# Patient Record
Sex: FEMALE | Employment: FULL TIME
[De-identification: names, ages, dates, MRNs, and addresses within clinical notes are randomized per-mention and may not be internally consistent; named-entity substitution may affect disease eponyms.]

---
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
## 2021-06-25 ENCOUNTER — LAB ENCOUNTER (OUTPATIENT)
Dept: LAB | Age: 21
End: 2021-06-25
Attending: PEDIATRICS
Payer: COMMERCIAL

## 2021-06-25 DIAGNOSIS — Z00.00 GENERAL MEDICAL EXAMINATION: Primary | ICD-10-CM

## 2021-06-25 PROCEDURE — 85652 RBC SED RATE AUTOMATED: CPT

## 2021-06-25 PROCEDURE — 83516 IMMUNOASSAY NONANTIBODY: CPT

## 2021-06-25 PROCEDURE — 80053 COMPREHEN METABOLIC PANEL: CPT

## 2021-06-25 PROCEDURE — 84443 ASSAY THYROID STIM HORMONE: CPT

## 2021-06-25 PROCEDURE — 36415 COLL VENOUS BLD VENIPUNCTURE: CPT

## 2021-06-25 PROCEDURE — 82306 VITAMIN D 25 HYDROXY: CPT

## 2021-06-25 PROCEDURE — 80061 LIPID PANEL: CPT

## 2021-06-25 PROCEDURE — 83550 IRON BINDING TEST: CPT

## 2021-06-25 PROCEDURE — 83540 ASSAY OF IRON: CPT

## 2021-06-25 PROCEDURE — 85025 COMPLETE CBC W/AUTO DIFF WBC: CPT

## 2021-06-25 PROCEDURE — 84439 ASSAY OF FREE THYROXINE: CPT

## 2021-06-25 PROCEDURE — 82728 ASSAY OF FERRITIN: CPT

## 2024-02-27 ENCOUNTER — APPOINTMENT (OUTPATIENT)
Dept: CT IMAGING | Age: 24
End: 2024-02-27
Attending: EMERGENCY MEDICINE
Payer: COMMERCIAL

## 2024-02-27 ENCOUNTER — ANESTHESIA (OUTPATIENT)
Dept: SURGERY | Facility: HOSPITAL | Age: 24
End: 2024-02-27
Payer: COMMERCIAL

## 2024-02-27 ENCOUNTER — HOSPITAL ENCOUNTER (OUTPATIENT)
Facility: HOSPITAL | Age: 24
Setting detail: OBSERVATION
Discharge: HOME | End: 2024-02-27
Attending: EMERGENCY MEDICINE | Admitting: STUDENT IN AN ORGANIZED HEALTH CARE EDUCATION/TRAINING PROGRAM
Payer: COMMERCIAL

## 2024-02-27 ENCOUNTER — ANESTHESIA EVENT (OUTPATIENT)
Dept: SURGERY | Facility: HOSPITAL | Age: 24
End: 2024-02-27
Payer: COMMERCIAL

## 2024-02-27 VITALS
SYSTOLIC BLOOD PRESSURE: 104 MMHG | HEIGHT: 64 IN | DIASTOLIC BLOOD PRESSURE: 65 MMHG | BODY MASS INDEX: 23.9 KG/M2 | WEIGHT: 140 LBS | TEMPERATURE: 98 F | HEART RATE: 77 BPM | OXYGEN SATURATION: 100 % | RESPIRATION RATE: 18 BRPM

## 2024-02-27 DIAGNOSIS — G89.18 POSTOPERATIVE PAIN: ICD-10-CM

## 2024-02-27 DIAGNOSIS — K37 APPENDICITIS: ICD-10-CM

## 2024-02-27 DIAGNOSIS — K35.80 ACUTE APPENDICITIS, UNSPECIFIED ACUTE APPENDICITIS TYPE: Primary | ICD-10-CM

## 2024-02-27 LAB
ALBUMIN SERPL-MCNC: 4.2 G/DL (ref 3.4–5)
ALBUMIN/GLOB SERPL: 1.1 {RATIO} (ref 1–2)
ALP LIVER SERPL-CCNC: 78 U/L
ALT SERPL-CCNC: 14 U/L
ANION GAP SERPL CALC-SCNC: 7 MMOL/L (ref 0–18)
AST SERPL-CCNC: 13 U/L (ref 15–37)
B-HCG UR QL: NEGATIVE
BASOPHILS # BLD AUTO: 0.03 X10(3) UL (ref 0–0.2)
BASOPHILS NFR BLD AUTO: 0.2 %
BILIRUB SERPL-MCNC: 0.4 MG/DL (ref 0.1–2)
BILIRUB UR QL STRIP.AUTO: NEGATIVE
BUN BLD-MCNC: 11 MG/DL (ref 9–23)
CALCIUM BLD-MCNC: 9.8 MG/DL (ref 8.5–10.1)
CHLORIDE SERPL-SCNC: 104 MMOL/L (ref 98–112)
CLARITY UR REFRACT.AUTO: CLEAR
CO2 SERPL-SCNC: 25 MMOL/L (ref 21–32)
COLOR UR AUTO: YELLOW
CREAT BLD-MCNC: 0.88 MG/DL
EGFRCR SERPLBLD CKD-EPI 2021: 95 ML/MIN/1.73M2 (ref 60–?)
EOSINOPHIL # BLD AUTO: 0.02 X10(3) UL (ref 0–0.7)
EOSINOPHIL NFR BLD AUTO: 0.1 %
ERYTHROCYTE [DISTWIDTH] IN BLOOD BY AUTOMATED COUNT: 16.1 %
GLOBULIN PLAS-MCNC: 3.8 G/DL (ref 2.8–4.4)
GLUCOSE BLD-MCNC: 128 MG/DL (ref 70–99)
GLUCOSE UR STRIP.AUTO-MCNC: NEGATIVE MG/DL
HCG SERPL QL: NEGATIVE
HCT VFR BLD AUTO: 39.3 %
HGB BLD-MCNC: 12.7 G/DL
IMM GRANULOCYTES # BLD AUTO: 0.07 X10(3) UL (ref 0–1)
IMM GRANULOCYTES NFR BLD: 0.4 %
KETONES UR STRIP.AUTO-MCNC: 40 MG/DL
LACTATE SERPL-SCNC: 1.1 MMOL/L (ref 0.4–2)
LEUKOCYTE ESTERASE UR QL STRIP.AUTO: NEGATIVE
LIPASE SERPL-CCNC: 30 U/L (ref 13–75)
LYMPHOCYTES # BLD AUTO: 0.56 X10(3) UL (ref 1–4)
LYMPHOCYTES NFR BLD AUTO: 3.1 %
MCH RBC QN AUTO: 24 PG (ref 26–34)
MCHC RBC AUTO-ENTMCNC: 32.3 G/DL (ref 31–37)
MCV RBC AUTO: 74.3 FL
MONOCYTES # BLD AUTO: 1.3 X10(3) UL (ref 0.1–1)
MONOCYTES NFR BLD AUTO: 7.1 %
NEUTROPHILS # BLD AUTO: 16.32 X10 (3) UL (ref 1.5–7.7)
NEUTROPHILS # BLD AUTO: 16.32 X10(3) UL (ref 1.5–7.7)
NEUTROPHILS NFR BLD AUTO: 89.1 %
NITRITE UR QL STRIP.AUTO: NEGATIVE
OSMOLALITY SERPL CALC.SUM OF ELEC: 283 MOSM/KG (ref 275–295)
PH UR STRIP.AUTO: 8.5 [PH] (ref 5–8)
PLATELET # BLD AUTO: 335 10(3)UL (ref 150–450)
POTASSIUM SERPL-SCNC: 3.7 MMOL/L (ref 3.5–5.1)
PROT SERPL-MCNC: 8 G/DL (ref 6.4–8.2)
PROT UR STRIP.AUTO-MCNC: NEGATIVE MG/DL
RBC # BLD AUTO: 5.29 X10(6)UL
RBC UR QL AUTO: NEGATIVE
SODIUM SERPL-SCNC: 136 MMOL/L (ref 136–145)
SP GR UR STRIP.AUTO: 1.02 (ref 1–1.03)
UROBILINOGEN UR STRIP.AUTO-MCNC: 0.2 MG/DL
WBC # BLD AUTO: 18.3 X10(3) UL (ref 4–11)

## 2024-02-27 PROCEDURE — 99245 OFF/OP CONSLTJ NEW/EST HI 55: CPT | Performed by: STUDENT IN AN ORGANIZED HEALTH CARE EDUCATION/TRAINING PROGRAM

## 2024-02-27 PROCEDURE — 74177 CT ABD & PELVIS W/CONTRAST: CPT | Performed by: EMERGENCY MEDICINE

## 2024-02-27 PROCEDURE — 44970 LAPAROSCOPY APPENDECTOMY: CPT | Performed by: STUDENT IN AN ORGANIZED HEALTH CARE EDUCATION/TRAINING PROGRAM

## 2024-02-27 PROCEDURE — 0DTJ4ZZ RESECTION OF APPENDIX, PERCUTANEOUS ENDOSCOPIC APPROACH: ICD-10-PCS | Performed by: STUDENT IN AN ORGANIZED HEALTH CARE EDUCATION/TRAINING PROGRAM

## 2024-02-27 PROCEDURE — 44970 LAPAROSCOPY APPENDECTOMY: CPT

## 2024-02-27 RX ORDER — HYDROMORPHONE HYDROCHLORIDE 1 MG/ML
0.1 INJECTION, SOLUTION INTRAMUSCULAR; INTRAVENOUS; SUBCUTANEOUS EVERY 2 HOUR PRN
Status: DISCONTINUED | OUTPATIENT
Start: 2024-02-27 | End: 2024-02-27

## 2024-02-27 RX ORDER — ONDANSETRON 2 MG/ML
4 INJECTION INTRAMUSCULAR; INTRAVENOUS ONCE
Status: COMPLETED | OUTPATIENT
Start: 2024-02-27 | End: 2024-02-27

## 2024-02-27 RX ORDER — HYDROCODONE BITARTRATE AND ACETAMINOPHEN 5; 325 MG/1; MG/1
1 TABLET ORAL ONCE AS NEEDED
Status: DISCONTINUED | OUTPATIENT
Start: 2024-02-27 | End: 2024-02-27

## 2024-02-27 RX ORDER — MORPHINE SULFATE 4 MG/ML
4 INJECTION, SOLUTION INTRAMUSCULAR; INTRAVENOUS ONCE
Status: COMPLETED | OUTPATIENT
Start: 2024-02-27 | End: 2024-02-27

## 2024-02-27 RX ORDER — LIDOCAINE HYDROCHLORIDE 10 MG/ML
INJECTION, SOLUTION EPIDURAL; INFILTRATION; INTRACAUDAL; PERINEURAL AS NEEDED
Status: DISCONTINUED | OUTPATIENT
Start: 2024-02-27 | End: 2024-02-27 | Stop reason: SURG

## 2024-02-27 RX ORDER — SODIUM CHLORIDE, SODIUM LACTATE, POTASSIUM CHLORIDE, CALCIUM CHLORIDE 600; 310; 30; 20 MG/100ML; MG/100ML; MG/100ML; MG/100ML
INJECTION, SOLUTION INTRAVENOUS CONTINUOUS PRN
Status: DISCONTINUED | OUTPATIENT
Start: 2024-02-27 | End: 2024-02-27 | Stop reason: SURG

## 2024-02-27 RX ORDER — NALOXONE HYDROCHLORIDE 0.4 MG/ML
0.08 INJECTION, SOLUTION INTRAMUSCULAR; INTRAVENOUS; SUBCUTANEOUS AS NEEDED
Status: DISCONTINUED | OUTPATIENT
Start: 2024-02-27 | End: 2024-02-27

## 2024-02-27 RX ORDER — KETOROLAC TROMETHAMINE 15 MG/ML
15 INJECTION, SOLUTION INTRAMUSCULAR; INTRAVENOUS EVERY 6 HOURS PRN
Status: DISCONTINUED | OUTPATIENT
Start: 2024-02-27 | End: 2024-02-27

## 2024-02-27 RX ORDER — BUPIVACAINE HYDROCHLORIDE 2.5 MG/ML
INJECTION, SOLUTION EPIDURAL; INFILTRATION; INTRACAUDAL AS NEEDED
Status: DISCONTINUED | OUTPATIENT
Start: 2024-02-27 | End: 2024-02-27 | Stop reason: HOSPADM

## 2024-02-27 RX ORDER — DEXAMETHASONE SODIUM PHOSPHATE 4 MG/ML
VIAL (ML) INJECTION AS NEEDED
Status: DISCONTINUED | OUTPATIENT
Start: 2024-02-27 | End: 2024-02-27 | Stop reason: SURG

## 2024-02-27 RX ORDER — ACETAMINOPHEN 500 MG
1000 TABLET ORAL ONCE AS NEEDED
Status: DISCONTINUED | OUTPATIENT
Start: 2024-02-27 | End: 2024-02-27

## 2024-02-27 RX ORDER — NEOSTIGMINE METHYLSULFATE 1 MG/ML
INJECTION, SOLUTION INTRAVENOUS AS NEEDED
Status: DISCONTINUED | OUTPATIENT
Start: 2024-02-27 | End: 2024-02-27 | Stop reason: SURG

## 2024-02-27 RX ORDER — ROCURONIUM BROMIDE 10 MG/ML
INJECTION, SOLUTION INTRAVENOUS AS NEEDED
Status: DISCONTINUED | OUTPATIENT
Start: 2024-02-27 | End: 2024-02-27 | Stop reason: SURG

## 2024-02-27 RX ORDER — ONDANSETRON 2 MG/ML
INJECTION INTRAMUSCULAR; INTRAVENOUS AS NEEDED
Status: DISCONTINUED | OUTPATIENT
Start: 2024-02-27 | End: 2024-02-27 | Stop reason: SURG

## 2024-02-27 RX ORDER — HYDROMORPHONE HYDROCHLORIDE 1 MG/ML
0.2 INJECTION, SOLUTION INTRAMUSCULAR; INTRAVENOUS; SUBCUTANEOUS EVERY 5 MIN PRN
Status: DISCONTINUED | OUTPATIENT
Start: 2024-02-27 | End: 2024-02-27

## 2024-02-27 RX ORDER — ONDANSETRON 4 MG/1
4 TABLET, ORALLY DISINTEGRATING ORAL EVERY 6 HOURS PRN
Status: DISCONTINUED | OUTPATIENT
Start: 2024-02-27 | End: 2024-02-27

## 2024-02-27 RX ORDER — HYDROMORPHONE HYDROCHLORIDE 1 MG/ML
0.6 INJECTION, SOLUTION INTRAMUSCULAR; INTRAVENOUS; SUBCUTANEOUS EVERY 5 MIN PRN
Status: DISCONTINUED | OUTPATIENT
Start: 2024-02-27 | End: 2024-02-27

## 2024-02-27 RX ORDER — HYDROMORPHONE HYDROCHLORIDE 1 MG/ML
0.4 INJECTION, SOLUTION INTRAMUSCULAR; INTRAVENOUS; SUBCUTANEOUS EVERY 5 MIN PRN
Status: DISCONTINUED | OUTPATIENT
Start: 2024-02-27 | End: 2024-02-27

## 2024-02-27 RX ORDER — ONDANSETRON 2 MG/ML
4 INJECTION INTRAMUSCULAR; INTRAVENOUS EVERY 6 HOURS PRN
Status: DISCONTINUED | OUTPATIENT
Start: 2024-02-27 | End: 2024-02-27

## 2024-02-27 RX ORDER — GLYCOPYRROLATE 0.2 MG/ML
INJECTION, SOLUTION INTRAMUSCULAR; INTRAVENOUS AS NEEDED
Status: DISCONTINUED | OUTPATIENT
Start: 2024-02-27 | End: 2024-02-27 | Stop reason: SURG

## 2024-02-27 RX ORDER — HYDROCODONE BITARTRATE AND ACETAMINOPHEN 5; 325 MG/1; MG/1
1 TABLET ORAL EVERY 6 HOURS PRN
Qty: 15 TABLET | Refills: 0 | Status: SHIPPED | OUTPATIENT
Start: 2024-02-27

## 2024-02-27 RX ORDER — SODIUM CHLORIDE 9 MG/ML
INJECTION, SOLUTION INTRAVENOUS CONTINUOUS
Status: DISCONTINUED | OUTPATIENT
Start: 2024-02-27 | End: 2024-02-27

## 2024-02-27 RX ORDER — KETOROLAC TROMETHAMINE 30 MG/ML
INJECTION, SOLUTION INTRAMUSCULAR; INTRAVENOUS
Status: COMPLETED
Start: 2024-02-27 | End: 2024-02-27

## 2024-02-27 RX ORDER — MIDAZOLAM HYDROCHLORIDE 1 MG/ML
INJECTION INTRAMUSCULAR; INTRAVENOUS AS NEEDED
Status: DISCONTINUED | OUTPATIENT
Start: 2024-02-27 | End: 2024-02-27 | Stop reason: SURG

## 2024-02-27 RX ORDER — HEPARIN SODIUM 5000 [USP'U]/ML
5000 INJECTION, SOLUTION INTRAVENOUS; SUBCUTANEOUS ONCE
Status: COMPLETED | OUTPATIENT
Start: 2024-02-27 | End: 2024-02-27

## 2024-02-27 RX ORDER — HYDROMORPHONE HYDROCHLORIDE 1 MG/ML
0.4 INJECTION, SOLUTION INTRAMUSCULAR; INTRAVENOUS; SUBCUTANEOUS EVERY 2 HOUR PRN
Status: DISCONTINUED | OUTPATIENT
Start: 2024-02-27 | End: 2024-02-27

## 2024-02-27 RX ORDER — MIDAZOLAM HYDROCHLORIDE 1 MG/ML
1 INJECTION INTRAMUSCULAR; INTRAVENOUS EVERY 5 MIN PRN
Status: DISCONTINUED | OUTPATIENT
Start: 2024-02-27 | End: 2024-02-27

## 2024-02-27 RX ORDER — METRONIDAZOLE 500 MG/100ML
500 INJECTION, SOLUTION INTRAVENOUS ONCE
Status: DISCONTINUED | OUTPATIENT
Start: 2024-02-27 | End: 2024-02-27

## 2024-02-27 RX ORDER — CEFAZOLIN SODIUM/WATER 2 G/20 ML
SYRINGE (ML) INTRAVENOUS AS NEEDED
Status: DISCONTINUED | OUTPATIENT
Start: 2024-02-27 | End: 2024-02-27 | Stop reason: SURG

## 2024-02-27 RX ORDER — HYDROCODONE BITARTRATE AND ACETAMINOPHEN 5; 325 MG/1; MG/1
2 TABLET ORAL ONCE AS NEEDED
Status: DISCONTINUED | OUTPATIENT
Start: 2024-02-27 | End: 2024-02-27

## 2024-02-27 RX ORDER — SODIUM CHLORIDE, SODIUM LACTATE, POTASSIUM CHLORIDE, CALCIUM CHLORIDE 600; 310; 30; 20 MG/100ML; MG/100ML; MG/100ML; MG/100ML
INJECTION, SOLUTION INTRAVENOUS CONTINUOUS
Status: DISCONTINUED | OUTPATIENT
Start: 2024-02-27 | End: 2024-02-27

## 2024-02-27 RX ORDER — PROCHLORPERAZINE EDISYLATE 5 MG/ML
5 INJECTION INTRAMUSCULAR; INTRAVENOUS EVERY 8 HOURS PRN
Status: DISCONTINUED | OUTPATIENT
Start: 2024-02-27 | End: 2024-02-27

## 2024-02-27 RX ORDER — KETOROLAC TROMETHAMINE 30 MG/ML
30 INJECTION, SOLUTION INTRAMUSCULAR; INTRAVENOUS ONCE
Status: COMPLETED | OUTPATIENT
Start: 2024-02-27 | End: 2024-02-27

## 2024-02-27 RX ORDER — ACETAMINOPHEN 325 MG/1
650 TABLET ORAL EVERY 6 HOURS PRN
Status: DISCONTINUED | OUTPATIENT
Start: 2024-02-27 | End: 2024-02-27

## 2024-02-27 RX ORDER — HYDROMORPHONE HYDROCHLORIDE 1 MG/ML
0.2 INJECTION, SOLUTION INTRAMUSCULAR; INTRAVENOUS; SUBCUTANEOUS EVERY 2 HOUR PRN
Status: DISCONTINUED | OUTPATIENT
Start: 2024-02-27 | End: 2024-02-27

## 2024-02-27 RX ORDER — HYDROCODONE BITARTRATE AND ACETAMINOPHEN 5; 325 MG/1; MG/1
1 TABLET ORAL EVERY 6 HOURS PRN
Qty: 30 TABLET | Refills: 0 | Status: SHIPPED | OUTPATIENT
Start: 2024-02-27

## 2024-02-27 RX ADMIN — SODIUM CHLORIDE, SODIUM LACTATE, POTASSIUM CHLORIDE, CALCIUM CHLORIDE: 600; 310; 30; 20 INJECTION, SOLUTION INTRAVENOUS at 14:38:00

## 2024-02-27 RX ADMIN — DEXAMETHASONE SODIUM PHOSPHATE 4 MG: 4 MG/ML VIAL (ML) INJECTION at 14:45:00

## 2024-02-27 RX ADMIN — CEFAZOLIN SODIUM/WATER 2 G: 2 G/20 ML SYRINGE (ML) INTRAVENOUS at 15:06:00

## 2024-02-27 RX ADMIN — ONDANSETRON 4 MG: 2 INJECTION INTRAMUSCULAR; INTRAVENOUS at 15:24:00

## 2024-02-27 RX ADMIN — NEOSTIGMINE METHYLSULFATE 1 MG: 1 INJECTION, SOLUTION INTRAVENOUS at 15:30:00

## 2024-02-27 RX ADMIN — NEOSTIGMINE METHYLSULFATE 2 MG: 1 INJECTION, SOLUTION INTRAVENOUS at 15:27:00

## 2024-02-27 RX ADMIN — LIDOCAINE HYDROCHLORIDE 100 MG: 10 INJECTION, SOLUTION EPIDURAL; INFILTRATION; INTRACAUDAL; PERINEURAL at 14:41:00

## 2024-02-27 RX ADMIN — MIDAZOLAM HYDROCHLORIDE 2 MG: 1 INJECTION INTRAMUSCULAR; INTRAVENOUS at 14:39:00

## 2024-02-27 RX ADMIN — ROCURONIUM BROMIDE 30 MG: 10 INJECTION, SOLUTION INTRAVENOUS at 14:45:00

## 2024-02-27 RX ADMIN — GLYCOPYRROLATE 0.4 MG: 0.2 INJECTION, SOLUTION INTRAMUSCULAR; INTRAVENOUS at 15:27:00

## 2024-02-27 NOTE — H&P
[facility]  Report of  Surgical Consultation with History and Physical Exam    Nida Mulligan Patient Status:  Observation    10/12/2000 MRN [id number]   Location [facility] [place] 2SW-S Attending Grant Bragg MD   Hosp Day # 0 PCP None Pcp     Reason for Surgical Consultation:  I am seeing this patient at the request of Dr. Coombs for abdominal pain.    History of Present Illness:  Nida Mulligan is a a(n) 23 year old female who presented to the [facility] ER early this morning with several hours of worsening abdominal pain.    The patient states around 11 PM yesterday evening, she had some leftover pasta.  She states approximately 1 hour later, she began to have some abdominal discomfort.  She felt as though she needed to vomit, but was unable to.  Then approximately 2 hours later, she began to have sharp periumbilical pain.  She states this pain was constant.  It was associated with 2 episodes of diarrhea and 6-7 episodes of emesis.  She states she took Tylenol while at home, with minimal relief.    She had a Tmax of 100 at the [facility] emergency department.  She has been tachycardic with a heart rate in the low 100s.  She has had associated chills.    Upon presentation to the emergency department, CT scan of the abdomen and pelvis revealed a mildly enlarged appendix measuring up to 1 cm.  There is mild infiltration of fat around the appendix.  Findings are consistent with early appendicitis.  No evidence of perforation or abscess.  All other significant findings can be seen in the radiologist report.    WBCs elevated at 18.3.  Hemoglobin 12.7.  Platelets 335.  Slightly hyperglycemic at 128.  AST slightly low at 13.  CMP is otherwise within normal limits.  Lipase is within normal limits at 30.  Lactic acid 1.1.    The patient has no significant past medical history.  She does not take any blood thinning medications.    The patient has no significant past surgical  history.            History:  History reviewed. No pertinent past medical history.  History reviewed. No pertinent surgical history.  No family history on file.   reports that she has never smoked. She has never been exposed to tobacco smoke. She has never used smokeless tobacco. She reports that she does not currently use alcohol. She reports that she does not currently use drugs.    Allergies:  No Known Allergies    Medications:    Current Facility-Administered Medications:     metRONIDAZOLE in sodium chloride 0.79% (Flagyl) 5 mg/mL IVPB premix 500 mg, 500 mg, Intravenous, Once    HYDROmorphone (Dilaudid) 1 MG/ML injection 0.1 mg, 0.1 mg, Intravenous, Q2H PRN **OR** HYDROmorphone (Dilaudid) 1 MG/ML injection 0.2 mg, 0.2 mg, Intravenous, Q2H PRN **OR** HYDROmorphone (Dilaudid) 1 MG/ML injection 0.4 mg, 0.4 mg, Intravenous, Q2H PRN    acetaminophen (Tylenol) tab 650 mg, 650 mg, Oral, Q6H PRN    ketorolac (Toradol) 15 MG/ML injection 15 mg, 15 mg, Intravenous, Q6H PRN    ondansetron (Zofran-ODT) disintegrating tab 4 mg, 4 mg, Oral, Q6H PRN **OR** ondansetron (Zofran) 4 MG/2ML injection 4 mg, 4 mg, Intravenous, Q6H PRN    sodium chloride 0.9% infusion, , Intravenous, Continuous    Review of Systems:  Pertinent items are noted in HPI.  Allergic/Immuno:  Review of patient's allergies performed.  Cardiovascular:  Negative for cool extremity and irregular heartbeat/palpitations  Constitutional:  Negative for decreased activity, fever, irritability and lethargy  Endocrine:  Negative for abnormal sleep patterns, increased activity, polydipsia and polyphagia  ENMT:  Negative for ear drainage, hearing loss and nasal drainage  Eyes:  Negative for eye discharge and vision loss  Gastrointestinal: Positive for abdominal pain, nausea, vomiting, diarrhea   Genitourinary:  Negative for dysuria and hematuria  Hema/Lymph:  Negative for easy bleeding and easy bruising  Integumentary:  Negative for pruritus and rash  Musculoskeletal:   Negative for bone/joint symptoms  Neurological:  Negative for gait disturbance  Psychiatric:  Negative for inappropriate interaction and psychiatric symptoms  Respiratory:  Negative for cough, dyspnea and wheezing      Physical Exam:  Blood pressure 106/65, pulse 106, temperature 100 °F (37.8 °C), temperature source Temporal, resp. rate 18, height 64\", weight 140 lb (63.5 kg), last menstrual period 02/11/2024, SpO2 100%.    General: Alert, orientated x3.  Cooperative.  No apparent distress.    HEENT: Exam is unremarkable.  Without scleral icterus.  Mucous membranes are moist. EOM are WNL.    Neck: No tenderness to palpitation.  Full range of motion to flexion and extension, lateral rotation and lateral flexion of cervical spine.  No JVD. Supple.     Lungs:  No secondary use of accessory respiratory musculature.    Abdomen: Soft, nondistended without tympany to percussion.  Minimal tenderness to palpation in the right lower quadrant and periumbilical regions.  No rebound or guarding.    Extremities:  No lower extremity edema noted.  Without clubbing or cyanosis.      Skin: Normal texture and turgor.      Laboratory Data and Relevant X-rays:  Recent Labs   Lab 02/27/24  0459   RBC 5.29   HGB 12.7   HCT 39.3   MCV 74.3*   MCH 24.0*   MCHC 32.3   RDW 16.1   NEPRELIM 16.32*   WBC 18.3*   .0       Recent Labs   Lab 02/27/24  0459   *   BUN 11   CREATSERUM 0.88   CA 9.8   ALB 4.2      K 3.7      CO2 25.0   ALKPHO 78   AST 13*   ALT 14   BILT 0.4   TP 8.0         No results for input(s): \"PTP\", \"INR\", \"PTT\" in the last 168 hours.    Imaging    Narrative   PROCEDURE:  CT ABDOMEN+PELVIS (CONTRAST ONLY) (CPT=74177)     COMPARISON:  None.     INDICATIONS:  Mid abdominal pain for the past 4 hours, N/V/D     TECHNIQUE:  CT scanning was performed from the dome of the diaphragm to the pubic symphysis with non-ionic intravenous contrast material. Post contrast coronal MPR imaging was performed.  Dose  reduction techniques were used. Dose information is  transmitted to the ACR (American College of Radiology) NRDR (National Radiology Data Registry) which includes the Dose Index Registry.     PATIENT STATED HISTORY:(As transcribed by Technologist)  Pt complains of midline abd pain with N/V/D      CONTRAST USED:  80cc of Isovue 370     FINDINGS:    LIVER:  No enlargement, atrophy, abnormal density, or significant focal lesion.    BILIARY:  No visible dilatation or calcification.    PANCREAS:  No lesion, fluid collection, ductal dilatation, or atrophy.    SPLEEN:  No enlargement or focal lesion.    KIDNEYS:  No mass, obstruction, or calcification.    ADRENALS:  No mass or enlargement.    AORTA/VASCULAR:  No aneurysm or dissection.    RETROPERITONEUM:  No mass or adenopathy.    BOWEL/MESENTERY:  Appendix is mildly enlarged measuring up to 1 cm.  There is mild infiltration of the fat around the appendix.  Findings could represent early appendicitis in the proper clinical setting.  ABDOMINAL WALL:  No mass or hernia.    URINARY BLADDER:  No visible focal wall thickening, lesion, or calculus.    PELVIC NODES:  No adenopathy.    PELVIC ORGANS:  No visible mass.  Pelvic organs appropriate for patient age.    BONES:  No bony lesion or fracture.    LUNG BASES:  No visible pulmonary or pleural disease.    OTHER:  Negative.                     Impression   CONCLUSION:  Appendix is mildly enlarged.  There is mild stranding around the appendix.  Findings likely represent early acute appendicitis in the proper clinical setting.        LOCATION:  [facility]        Dictated by (CST): Aakash Bridges MD on 2/27/2024 at 6:30 AM      Finalized by (CST): Aakash Bridges MD on 2/27/2024 at 6:37 AM       Kasey Saul PA-C  2/27/2024  11:09 AM    Is this a shared or split note between Advanced Practice Provider and Physician? Yes   Impression:  Patient Active Problem List   Diagnosis    Acute appendicitis, unspecified acute  appendicitis type   I personally viewed the imaging of patient's CT scan of the abdomen and pelvis.  I agree with radiology conclusion as above.    This is a very nice 23-year-old female who presented to the [place] emergency department early this morning with 1 day of abdominal pain, nausea, vomiting and low-grade fevers.  Her overall clinical picture and workup is suggestive of acute appendicitis.    Plan:  I recommend proceeding urgently to the operating room for laparoscopic appendectomy, possible open.  The details of this procedure were discussed with the patient and her family at bedside including the expected recovery time, risks, benefits and alternatives.  Patient and family expressed understanding and were agreeable to proceed with surgery.  Consent was signed.  All questions answered.     Postoperative disposition to be determined pending surgical findings and intraoperative course. Hopeful for discharge after surgery today if things go well.  Patient expressed understanding.     Grant Bragg MD  [facility] General Surgery

## 2024-02-27 NOTE — OPERATIVE REPORT
[facility]  Operative Note    Nida Mulligan Location: OR   [facility] [id number] MRN [id number]    10/12/2000 Age 23 year old   Admission Date 2024 Operation Date 2024   Attending Physician Grant Bragg MD Operating Physician Grant Bragg MD   PCP None Pcp          Patient Name: Nida Mulligan    Preoperative Diagnosis: Appendicitis [K37]    Postoperative Diagnosis: Acute appendicitis    Primary Surgeon: Grant Bragg MD    Assistant: Kasey PIPER    Anesthesia: General    Procedures: Laparoscopic appendectomy    Implants: None    Specimen: Appendix    Drains: None    Estimated Blood Loss: 5 cc    Complications: None immediate    Condition: Stable    Indications for Surgery:   This is a very nice 23-year-old female who presented to the [place] emergency department early this morning with 1 day of abdominal pain, nausea, vomiting and low-grade fevers.  Her overall clinical picture and workup is suggestive of acute appendicitis.     I recommend proceeding urgently to the operating room for laparoscopic appendectomy, possible open.  The details of this procedure were discussed with the patient and her family at bedside including the expected recovery time, risks, benefits and alternatives.  Patient and family expressed understanding and were agreeable to proceed with surgery.  Consent was signed.  All questions answered.     Surgical Findings:   Dilated, indurated and inflamed pelvic appendix    Description of Procedure:   Patient was brought to the operating room and laid supine on the OR table.  Bilateral sequential compression devices were placed.  Preoperative antibiotics were given. Patient was induced under general anesthesia.  A Perry catheter was inserted under sterile technique.  The left arm was carefully tucked with all pressure points well-padded.  The abdomen was prepped and draped in the usual sterile fashion.  A timeout was performed.     I began by establishing  pneumoperitoneum using a Veress needle through a 5 mm incision just above the umbilicus. There was appropriately low opening pressure.  The Veress needle was removed and A 5 mm optical trocar was placed under direct laparoscopic vision. There was no evidence underlying visceral injury.  A 12 mm left lower quadrant and a 5 mm suprapubic trocar were placed under direct vision.  The patient was positioned right side up and in slight Trendelenburg.     Examination of the omental surface, liver surface and pelvis was unremarkable.  I was able to identify a dilated, indurated and inflamed pelvic appendix beneath the right colon. The appendix was grasped and a window was made at the base of the mesoappendix using a Maryland dissector.  A 45 mm laparoscopic MIO stapler with blue load was used to divide the appendix at its base.  The mesoappendix was divided using a Ligasure device. The surgical field copiously irrigated, suctioned, and remained hemostatic.  The appendix was placed in a specimen bag and retrieved through the 12 mm left lateral trocar.  The patient was leveled. The appendix was retrieved within the specimen bag and passed off the field as a specimen.     The fascia at the 12 mm trocar site was closed using a single, interrupted 0 PDS suture with the assistance of a Riccardo-Jaya device.  The remaining trocars were removed under direct vision and appeared hemostatic.  Pneumoperitoneum was evacuated.  Each skin incision was anesthetized using a total of 30 cc of 0.25% Marcaine.  The skin incisions were closed using interrupted 4-0 Monocryl in a subcuticular fashion.  The skin was cleaned and dried and skin glue was placed over each incision as a final dressing.     The Perry catheter was removed.  Patient was awakened from anesthesia, extubated and transported to the postanesthesia care unit in stable condition.  All sponge, needle and instrument counts were correct at the end of the case.  I was present for  the entire case.      Grant Bragg MD  2/27/2024  3:48 PM

## 2024-02-27 NOTE — ANESTHESIA PREPROCEDURE EVALUATION
PRE-OP EVALUATION    Patient Name: Nida Mulligan    Admit Diagnosis: Acute appendicitis, unspecified acute appendicitis type [K35.80]    Pre-op Diagnosis: Appendicitis [K37]    LAPAROSCOPIC APPENDECTOMY    Anesthesia Procedure: LAPAROSCOPIC APPENDECTOMY (Abdomen)    Surgeon(s) and Role:     * Grant Bragg MD - Primary    Pre-op vitals reviewed.  Temp: 98.1 °F (36.7 °C)  Pulse: 106  Resp: 18  BP: 106/65  SpO2: 100 %  Body mass index is 24.03 kg/m².    Current medications reviewed.  Hospital Medications:   [COMPLETED] ondansetron (Zofran) 4 MG/2ML injection 4 mg  4 mg Intravenous Once    [COMPLETED] sodium chloride 0.9 % IV bolus 1,000 mL  1,000 mL Intravenous Once    [COMPLETED] morphINE PF 4 MG/ML injection 4 mg  4 mg Intravenous Once    [COMPLETED] iopamidol 76% (ISOVUE-370) injection for power injector  80 mL Intravenous ONCE PRN    [COMPLETED] cefTRIAXone (Rocephin) 2 g in D5W 100 mL IVPB-ADD  2 g Intravenous Once    metRONIDAZOLE in sodium chloride 0.79% (Flagyl) 5 mg/mL IVPB premix 500 mg  500 mg Intravenous Once    [COMPLETED] heparin (Porcine) 5000 UNIT/ML injection 5,000 Units  5,000 Units Subcutaneous Once    HYDROmorphone (Dilaudid) 1 MG/ML injection 0.1 mg  0.1 mg Intravenous Q2H PRN    Or    HYDROmorphone (Dilaudid) 1 MG/ML injection 0.2 mg  0.2 mg Intravenous Q2H PRN    Or    HYDROmorphone (Dilaudid) 1 MG/ML injection 0.4 mg  0.4 mg Intravenous Q2H PRN    acetaminophen (Tylenol) tab 650 mg  650 mg Oral Q6H PRN    ketorolac (Toradol) 15 MG/ML injection 15 mg  15 mg Intravenous Q6H PRN    ondansetron (Zofran-ODT) disintegrating tab 4 mg  4 mg Oral Q6H PRN    Or    ondansetron (Zofran) 4 MG/2ML injection 4 mg  4 mg Intravenous Q6H PRN    sodium chloride 0.9% infusion   Intravenous Continuous       Outpatient Medications:     No medications prior to admission.       Allergies: Patient has no known allergies.      Anesthesia Evaluation    Patient summary reviewed.    Anesthetic Complications  (-)  history of anesthetic complications         GI/Hepatic/Renal  Comment: Appendicitis                               Cardiovascular    Negative cardiovascular ROS.        MET: >4                                           Endo/Other    Negative endo/other ROS.                              Pulmonary    Negative pulmonary ROS.                       Neuro/Psych    Negative neuro/psych ROS.                                  History reviewed. No pertinent surgical history.  Social History     Socioeconomic History    Marital status: Single   Tobacco Use    Smoking status: Never     Passive exposure: Never    Smokeless tobacco: Never   Vaping Use    Vaping Use: Never used   Substance and Sexual Activity    Alcohol use: Not Currently    Drug use: Not Currently     History   Drug Use Unknown     Available pre-op labs reviewed.  Lab Results   Component Value Date    WBC 18.3 (H) 02/27/2024    RBC 5.29 02/27/2024    HGB 12.7 02/27/2024    HCT 39.3 02/27/2024    MCV 74.3 (L) 02/27/2024    MCH 24.0 (L) 02/27/2024    MCHC 32.3 02/27/2024    RDW 16.1 02/27/2024    .0 02/27/2024     Lab Results   Component Value Date     02/27/2024    K 3.7 02/27/2024     02/27/2024    CO2 25.0 02/27/2024    BUN 11 02/27/2024    CREATSERUM 0.88 02/27/2024     (H) 02/27/2024    CA 9.8 02/27/2024            Airway      Mallampati: II  Mouth opening: >3 FB  TM distance: > 6 cm  Neck ROM: full Cardiovascular    Cardiovascular exam normal.  Rhythm: regular  Rate: normal     Dental  Comment: Denies chipped or loose teeth           Pulmonary    Pulmonary exam normal.  Breath sounds clear to auscultation bilaterally.               Other findings              ASA: 2   Plan: general  NPO status verified and patient meets guidelines.    Post-procedure pain management plan discussed with surgeon and patient.    Comment: Risks include but limited to oral and dental injury, nausea/vomiting, anaphylaxis, prolonged ventilation and myocardial  infarction. All questions were answered to the patient's satisfaction. Patient expressed understanding and wishes to proceed.   Plan/risks discussed with: patient                Present on Admission:  **None**

## 2024-02-27 NOTE — ANESTHESIA POSTPROCEDURE EVALUATION
[facility]    Nida Mulligan Patient Status:  Observation   Age/Gender 23 year old female MRN [id number]   Location [facility] POST ANESTHESIA CARE UNIT Attending Grant Bragg MD   Hosp Day # 0 PCP None Pcp       Anesthesia Post-op Note    LAPAROSCOPIC APPENDECTOMY    Procedure Summary       Date: 02/27/24 Room / Location:  MAIN OR 05 / [facility] MAIN OR    Anesthesia Start: 1438 Anesthesia Stop: 1548    Procedure: LAPAROSCOPIC APPENDECTOMY (Abdomen) Diagnosis:       Appendicitis      (Appendicitis [K37])    Surgeons: Grant Bragg MD Anesthesiologist: Marlen Barr MD    Anesthesia Type: general ASA Status: 2            Anesthesia Type: general    Vitals Value Taken Time   BP 99/62 02/27/24 1548   Temp 99 02/27/24 1548   Pulse 77 02/27/24 1548   Resp 18 02/27/24 1548   SpO2 100 02/27/24 1548       Patient Location: PACU    Anesthesia Type: general    Airway Patency: patent and extubated    Postop Pain Control: adequate    Mental Status: mildly sedated but able to meaningfully participate in the post-anesthesia evaluation    Nausea/Vomiting: none    Cardiopulmonary/Hydration status: stable euvolemic    Complications: no apparent anesthesia related complications    Postop vital signs: stable    Dental Exam: Unchanged from Preop    Patient to be discharged from PACU when criteria met.

## 2024-02-27 NOTE — DISCHARGE INSTRUCTIONS
Post-Surgical Discharge Instructions    Grant Bragg MD    Pain: You may take acetaminophen (Tylenol) up to 650 mg every 6 hours as needed for mild-moderate pain. You may take ibuprofen (Motrin) up to 600 mg every 6 hours as needed for mild-moderate pain. Take narcotic pain medication as needed for severe pain per your prescription. Do not drive while taking narcotic pain medication. Many patients take a stool softener as narcotics are known to cause constipation. Okay to use ice packs over abdomen    Diet:  No restrictions.    Activity:  No heavy lifting greater than 10 lbs and no exercising for a total of 6 weeks from the date of surgery.  You may walk and climb stairs with moderation. If your abdomen is more uncomfortable than the day before, you need to be less active as you may be pulling on your stitches.    Bathing:  You may shower as often as you would like, but no submerging the incisions under water for a total of 2 weeks from the date of surgery.  This includes no swimming, no baths, and no hot-tubs.      Wound:  Keep the wound dry.  No dressing is necessary unless there is drainage coming from the wound.  If the drainage is excessive or looks like pus, please call our office.    Driving: You may drive provided that you are no longer taking your narcotic pain medication.      Bowel Function:  After surgery, your bowel movements will be irregular.  It is normal to have up to 3 bowel movements a day or as low as 1 bowel movement every 3 days.  Occasionally, your movements may be even more irregular than this.  As long as you are not vomiting or have a fever over 100.3, you don’t need to be overly concerned.      Return to Work:  Most patients return to work after 1-2 weeks from the date of their surgery.  You will still have a lifting restriction of no greater than 10 lbs from the date of your surgery until 6 weeks.  If your work requires heavy lifting, you will need to stay off work for 6 weeks.  When  you are ready to return to work, please call the office and we will send a work release to your employer.      Appointment: Please call our office for an appointment in 10-14 days after surgery, unless otherwise instructed.  This will allow ample time for the swelling and soreness to resolve before your wound is examined. If you have fevers, chills, or if you are concerned about your wound, please call us immediately at [phone].      Thank you for entrusting us with your care.     You received a drug called Toradol which is an Anti Inflammatory at: 4pm  If you are allowed to take Anti inflammatories:    Do not take any Anti Inflammatory like Motrin, Aleve or Ibuprofen until after: 10pm  Please report any suspected allergic reactions or bleeding issues to your doctor

## 2024-02-27 NOTE — ED INITIAL ASSESSMENT (HPI)
PT to the ED for evaluation of lower abdominal pain with n/v/d and low grade fever for the last few hours.

## 2024-02-27 NOTE — PLAN OF CARE
Admitted from [place] ED this morning. Aox4. C/o moderate abdominal pain, pain meds given with relief. Denies nausea. NPO for surgery. Surgical consent signed with patient, placed in chart. Parents at bedside. Sent to OR holding this afternoon, will discharge after surgery from PACU.

## 2024-02-27 NOTE — ED QUICK NOTES
Orders for admission, patient is aware of plan and ready to go upstairs. Any questions, please call ED RN Delia  at extension [phone].     Vaccinated?unk   Type of COVID test sent:none  COVID Suspicion level: Low      Titratable drug(s) infusing:na  Rate:    LOC at time of transport:  Alert and oriented  Other pertinent information:  Dad will be driving pt as soon as Rocephin is completed. Pt will still need Flagyl on arrival.  CIWA score=na  NIH score=na

## 2024-02-27 NOTE — ANESTHESIA PROCEDURE NOTES
Airway  Date/Time: 2/27/2024 2:43 PM  Urgency: elective      General Information and Staff    Patient location during procedure: OR  Anesthesiologist: Marlen Barr MD  Performed: anesthesiologist   Performed by: Marlen Barr MD  Authorized by: Marlen Barr MD      Indications and Patient Condition  Indications for airway management: anesthesia  Spontaneous Ventilation: absent  Sedation level: deep  Preoxygenated: yes  Patient position: sniffing  Mask difficulty assessment: 0 - not attempted    Final Airway Details  Final airway type: endotracheal airway      Successful airway: ETT  Cuffed: yes   Successful intubation technique: direct laryngoscopy  Facilitating devices/methods: rapid sequence intubation  Endotracheal tube insertion site: oral  Blade: Sujit  Blade size: #3  ETT size (mm): 7.5    Cormack-Lehane Classification: grade I - full view of glottis  Placement verified by: capnometry   Measured from: lips  ETT to lips (cm): 22  Number of attempts at approach: 1

## 2024-02-27 NOTE — ED PROVIDER NOTES
Patient Seen in: [facility] In [place]      History     Chief Complaint   Patient presents with    Abdomen/Flank Pain    Nausea/Vomiting/Diarrhea     Stated Complaint: Mid abdominal pain for the past 4 hours, N/V/D    Subjective:   HPI    Patient is a 23-year-old female presenting to the ED with abdominal pain that started approximately midnight.  The history is obtained from patient who is a good historian.  Pain is constant, 7 out of 10, not well-characterized, located in the mid to right lower quadrant, worse and route to the ED, no alleviating factors.  The patient had some leftover pasta at approximately 11 PM.  The patient had subsequent nausea with a couple episodes of vomiting.  She believes she had some chills and may have developed a fever at home but did not check her temperature prior to arrival.  She did take 2 ibuprofen at that time.  She also reports a couple loose stools.  No associated urinary symptoms.  No flank pain.  The patient states she has never been sexually active.  No abnormal vaginal discharge or bleeding.  No prior abdominal surgery.    Objective:   History reviewed. No pertinent past medical history.           History reviewed. No pertinent surgical history.             Social History     Socioeconomic History    Marital status: Single   Tobacco Use    Smoking status: Never     Passive exposure: Never    Smokeless tobacco: Never   Vaping Use    Vaping Use: Never used   Substance and Sexual Activity    Alcohol use: Not Currently    Drug use: Not Currently              Review of Systems    Positive for stated complaint: Mid abdominal pain for the past 4 hours, N/V/D  Other systems are as noted in HPI.  Constitutional and vital signs reviewed.      All other systems reviewed and negative except as noted above.    Physical Exam     ED Triage Vitals [02/27/24 0446]   /77   Pulse 94   Resp 16   Temp 100 °F (37.8 °C)   Temp src Temporal   SpO2 100 %   O2 Device None  (Room air)       Current:/73   Pulse 100   Temp 100 °F (37.8 °C) (Temporal)   Resp 18   Ht 162.6 cm (5' 4\")   Wt 63.5 kg   LMP 02/11/2024 (Approximate)   SpO2 100%   BMI 24.03 kg/m²         Physical Exam  Vitals and nursing note reviewed.   Constitutional:       General: She is not in acute distress.     Appearance: Normal appearance. She is well-developed. She is not ill-appearing or toxic-appearing.   HENT:      Head: Normocephalic and atraumatic.      Right Ear: External ear normal.      Left Ear: External ear normal.      Mouth/Throat:      Mouth: Mucous membranes are moist.      Pharynx: Oropharynx is clear.   Eyes:      Conjunctiva/sclera: Conjunctivae normal.   Cardiovascular:      Rate and Rhythm: Normal rate and regular rhythm.      Heart sounds: Normal heart sounds.   Pulmonary:      Effort: Pulmonary effort is normal.      Breath sounds: Normal breath sounds.   Abdominal:      General: Abdomen is flat. Bowel sounds are normal. There is no distension.      Palpations: Abdomen is soft.      Tenderness: There is abdominal tenderness. There is no guarding.      Comments: Tenderness right lower quadrant   Musculoskeletal:      Right lower leg: No edema.      Left lower leg: No edema.   Skin:     General: Skin is warm.      Capillary Refill: Capillary refill takes less than 2 seconds.      Findings: No rash.   Neurological:      Mental Status: She is alert.   Psychiatric:         Mood and Affect: Mood normal.         Behavior: Behavior normal.               ED Course     Labs Reviewed   COMP METABOLIC PANEL (14) - Abnormal; Notable for the following components:       Result Value    Glucose 128 (*)     AST 13 (*)     All other components within normal limits   CBC W/ DIFFERENTIAL - Abnormal; Notable for the following components:    WBC 18.3 (*)     MCV 74.3 (*)     MCH 24.0 (*)     Neutrophil Absolute Prelim 16.32 (*)     Neutrophil Absolute 16.32 (*)     Lymphocyte Absolute 0.56 (*)     Monocyte  Absolute 1.30 (*)     All other components within normal limits   LIPASE - Normal   HCG, BETA SUBUNIT, QUAL - Normal   POCT PREGNANCY URINE - Normal   CBC WITH DIFFERENTIAL WITH PLATELET    Narrative:     The following orders were created for panel order CBC With Differential With Platelet.  Procedure                               Abnormality         Status                     ---------                               -----------         ------                     CBC W/ DIFFERENTIAL[[id number]]          Abnormal            Final result                 Please view results for these tests on the individual orders.   URINALYSIS, ROUTINE   LACTIC ACID, PLASMA   BLOOD CULTURE   BLOOD CULTURE                      MDM      History obtained from patient.     Differential diagnosis includes acute appendicitis, ruptured ovarian cyst, gastroenteritis, foodborne illness, viral illness, less likely kidney stone or UTI.    Previous records reviewed.  Most recent visit was 2021 for hair loss and depression screening.  No other recent ED visits.    Testing considered and ordered includes CBC, CMP, lipase, UA and UCG, lipase, CT scan of the abdomen and pelvis.  Blood cultures and lactic acid were also ordered.    I reviewed all results.  Leukocytosis with WBC at 18.3 with neutrophilic shift.  CMP unremarkable.  Lipase is normal.  UCG negative.  Cultures and lactic acid are pending.      I also reviewed the official report which shows   CT ABDOMEN+PELVIS(CONTRAST ONLY)(CPT=74177)    Result Date: 2/27/2024  PROCEDURE:  CT ABDOMEN+PELVIS (CONTRAST ONLY) (CPT=74177)  COMPARISON:  None.  INDICATIONS:  Mid abdominal pain for the past 4 hours, N/V/D  TECHNIQUE:  CT scanning was performed from the dome of the diaphragm to the pubic symphysis with non-ionic intravenous contrast material. Post contrast coronal MPR imaging was performed.  Dose reduction techniques were used. Dose information is transmitted to the ACR (American College of  Radiology) NRDR (National Radiology Data Registry) which includes the Dose Index Registry.  PATIENT STATED HISTORY:(As transcribed by Technologist)  Pt complains of midline abd pain with N/V/D   CONTRAST USED:  80cc of Isovue 370  FINDINGS:  LIVER:  No enlargement, atrophy, abnormal density, or significant focal lesion.  BILIARY:  No visible dilatation or calcification.  PANCREAS:  No lesion, fluid collection, ductal dilatation, or atrophy.  SPLEEN:  No enlargement or focal lesion.  KIDNEYS:  No mass, obstruction, or calcification.  ADRENALS:  No mass or enlargement.  AORTA/VASCULAR:  No aneurysm or dissection.  RETROPERITONEUM:  No mass or adenopathy.  BOWEL/MESENTERY:  Appendix is mildly enlarged measuring up to 1 cm.  There is mild infiltration of the fat around the appendix.  Findings could represent early appendicitis in the proper clinical setting. ABDOMINAL WALL:  No mass or hernia.  URINARY BLADDER:  No visible focal wall thickening, lesion, or calculus.  PELVIC NODES:  No adenopathy.  PELVIC ORGANS:  No visible mass.  Pelvic organs appropriate for patient age.  BONES:  No bony lesion or fracture.  LUNG BASES:  No visible pulmonary or pleural disease.  OTHER:  Negative.             CONCLUSION:  Appendix is mildly enlarged.  There is mild stranding around the appendix.  Findings likely represent early acute appendicitis in the proper clinical setting.   LOCATION:  [facility]   Dictated by (CST): Aakash Bridges MD on 2/27/2024 at 6:30 AM     Finalized by (CST): Aakash Bridges MD on 2/27/2024 at 6:37 AM          Others who assisted in patient's care included neurosurgery, case was discussed with Dr. Bragg.     Interventions in care included IV fluids, patient was also given morphine and Zofran for pain and nausea.  Antibiotics were also ordered, Rocephin and Flagyl.  Patient to remain NPO.  Discussed all results as well as plan for transfer to [facility] for surgery for acute appendicitis.  Dad is also at  bedside.  He is willing to take patient when bed is available.        Admission disposition: 2/27/2024  6:47 AM                                        Medical Decision Making      Disposition and Plan     Clinical Impression:  1. Acute appendicitis, unspecified acute appendicitis type         Disposition:  Admit  2/27/2024  6:47 am    Follow-up:  No follow-up provider specified.        Medications Prescribed:  There are no discharge medications for this patient.                        Hospital Problems       Present on Admission             ICD-10-CM Noted POA    * (Principal) Acute appendicitis, unspecified acute appendicitis type K35.80 2/27/2024 Unknown

## 2024-02-28 ENCOUNTER — PATIENT OUTREACH (OUTPATIENT)
Dept: CASE MANAGEMENT | Age: 24
End: 2024-02-28

## 2024-02-29 ENCOUNTER — PATIENT OUTREACH (OUTPATIENT)
Dept: CASE MANAGEMENT | Age: 24
End: 2024-02-29

## 2024-02-29 NOTE — PROGRESS NOTES
Hospital follow up.    TCC request.  Tuesday 3/5 @3    Confirmed with patient.    Closing encounter.

## 2024-03-05 ENCOUNTER — OFFICE VISIT (OUTPATIENT)
Dept: INTERNAL MEDICINE CLINIC | Facility: CLINIC | Age: 24
End: 2024-03-05
Payer: COMMERCIAL

## 2024-03-05 VITALS
HEART RATE: 97 BPM | OXYGEN SATURATION: 98 % | TEMPERATURE: 97 F | HEIGHT: 64 IN | WEIGHT: 137 LBS | DIASTOLIC BLOOD PRESSURE: 75 MMHG | RESPIRATION RATE: 16 BRPM | SYSTOLIC BLOOD PRESSURE: 129 MMHG | BODY MASS INDEX: 23.39 KG/M2

## 2024-03-05 DIAGNOSIS — K59.03 DRUG-INDUCED CONSTIPATION: ICD-10-CM

## 2024-03-05 DIAGNOSIS — K35.80 ACUTE APPENDICITIS, UNSPECIFIED ACUTE APPENDICITIS TYPE: Primary | ICD-10-CM

## 2024-03-05 DIAGNOSIS — Z90.49 S/P LAPAROSCOPIC APPENDECTOMY: ICD-10-CM

## 2024-03-05 PROCEDURE — 3008F BODY MASS INDEX DOCD: CPT | Performed by: NURSE PRACTITIONER

## 2024-03-05 PROCEDURE — 3078F DIAST BP <80 MM HG: CPT | Performed by: NURSE PRACTITIONER

## 2024-03-05 PROCEDURE — 99495 TRANSJ CARE MGMT MOD F2F 14D: CPT | Performed by: NURSE PRACTITIONER

## 2024-03-05 PROCEDURE — 3074F SYST BP LT 130 MM HG: CPT | Performed by: NURSE PRACTITIONER

## 2024-03-05 RX ORDER — IBUPROFEN 600 MG/1
600 TABLET ORAL EVERY 6 HOURS PRN
Qty: 30 TABLET | Refills: 0 | Status: SHIPPED | OUTPATIENT
Start: 2024-03-05

## 2024-03-05 RX ORDER — ACETAMINOPHEN 500 MG
1000 TABLET ORAL EVERY 6 HOURS PRN
COMMUNITY

## 2024-03-05 NOTE — PROGRESS NOTES
[facility] VISIT  [facility]      HISTORY   CHIEF COMPLAINT: HFU-acute appendicitis, s/p laparoscopic appendectomy, drug-induced constipation.     HPI: Nida Mulligan is a 23 year old female here today for hospital follow up for acute appendicitis, s/p laparoscopic appendectomy, drug-induced constipation.  Nida Mulligan was discharged from [facility]  to Home or Self Care.  Admit Date: 2/27/24. Discharge Date: 2/27/24.     Hospital Discharge Diagnosis:       Acute appendicitis  S/p laparoscopic appendectomy  Drug-induced constipation    Hospital stay was uncomplicated.  Nida Mulligan was discharge with Norco, Tylenol, ibuprofen, postop restrictions and a referral to the [facility] for continued follow up.      Today, patient is being seen for hospital follow-up.  She reports doing good but having some soreness since discharge.  She is accompanied by family at time of exam.    She presented to ED after several hours of worsening abdominal pain.  She reports around 11 PM the night prior to admission she had some leftover Posta.  She reports approximately 1 hour later she began having abdominal discomfort.  She felt as if she needed to vomit but was unable to.  Approximately 2 hours later she began to have sharp periumbilical pain that was constant.  It was associated with 2 episodes of diarrhea and 6-7 episodes of emesis.  She states she took Tylenol while at home with minimal relief.  She had a mild temperature at time of admission at 100 at [place] [facility].  She was also found to be tachycardic with associated chills.  She had CT AP which revealed mildly enlarged appendix measuring up to 1 cm with mild infiltration of fat around the appendix.  Findings were consistent with early appendicitis, no evidence of perforation or abscess.  She was seen by general surgery and underwent laparoscopic appendectomy and tolerated procedure without difficulties.  She was tolerating a diet, up  ambulating, and pain was controlled so she was cleared for discharge and discharged home in good condition.    Interactive contact within 2 business days post discharge first initiated on Date: 2/28/2024      Allergies:  No Known Allergies   Current Meds:  Current Outpatient Medications   Medication Sig Dispense Refill    acetaminophen 500 MG Oral Tab Take 2 tablets (1,000 mg total) by mouth every 6 (six) hours as needed for Pain.      HYDROcodone-acetaminophen (NORCO) 5-325 MG Oral Tab Take 1 tablet by mouth every 6 (six) hours as needed for Pain. 15 tablet 0     No current facility-administered medications for this visit.       HISTORY: reconciled and reviewed with patient  No past medical history on file.   Past Surgical History:   Procedure Laterality Date    LAPAROSCOPIC APPENDECTOMY  02/27/2024      No family history on file.   Social History     Socioeconomic History    Marital status: Single   Tobacco Use    Smoking status: Never     Passive exposure: Never    Smokeless tobacco: Never   Vaping Use    Vaping Use: Never used   Substance and Sexual Activity    Alcohol use: Not Currently    Drug use: Not Currently     Social Determinants of Health     Food Insecurity: No Food Insecurity (2/27/2024)    Food Insecurity     Food Insecurity: Never true   Transportation Needs: No Transportation Needs (2/27/2024)    Transportation Needs     Lack of Transportation: No   Housing Stability: Low Risk  (2/27/2024)    Housing Stability     Housing Instability: No        Imaging & Consults:        Lab Results   Component Value Date/Time    WBC 18.3 (H) 02/27/2024 04:59 AM    HGB 12.7 02/27/2024 04:59 AM    HCT 39.3 02/27/2024 04:59 AM    .0 02/27/2024 04:59 AM     (H) 02/27/2024 04:59 AM     02/27/2024 04:59 AM    K 3.7 02/27/2024 04:59 AM     02/27/2024 04:59 AM    CO2 25.0 02/27/2024 04:59 AM    BUN 11 02/27/2024 04:59 AM    CREATSERUM 0.88 02/27/2024 04:59 AM    CA 9.8 02/27/2024 04:59 AM    ALB  4.2 02/27/2024 04:59 AM    ALT 14 02/27/2024 04:59 AM    AST 13 (L) 02/27/2024 04:59 AM         There is no immunization history on file for this patient.    ROS:  GENERAL: energy fair/stable, denies fever, weakness  SKIN: denies any skin changes, + lap sites x 3 to abdomen with glue closure D/I and CYNTHIA  EYES: denies blurred vision, eye pain  HEENT: denies ear pain, loss of hearing, sore throat  RESPIRATORY: denies cough, denies dyspnea with exertion  CARDIOVASCULAR: denies syncope, chest pain, fatigue, palpitations   GI: + Incisional abdominal denies pain, melena, + resolved constipation, denies diarrhea, nausea, vomiting   MUSCULOSKELETAL: denies pain, states normal range of motion of extremities  NEUROLOGIC: denies confusion, balance difficulty  PSYCHIATRIC: denies depression or anxiety  HEMATOLOGIC: denies history of anemia, bruising, bleeding    PHYSICAL EXAM:  Vitals:    03/05/24 1511   BP: 129/75   Pulse: 97   Resp: 16   Temp: 97.4 °F (36.3 °C)       GENERAL: well developed, well nourished, in no apparent distress, good historian  INTEGUMENTARY: warm, dry, no rashes, + lap sites x 3 to abdomen with glue closure D/I and CYNTHIA  HEENT: atraumatic, normocephalic, sclera white, conjunctivae pink  NECK: supple  CHEST: no chest tenderness  LUNGS: clear to auscultation bilaterally, no wheezes, rhonchi or rales, normal respiratory effort  CARDIO: S1, S2, RRR without audible murmur  GI: + BS to all quadrants, no tenderness  MUSCULOSKELETAL: + ROM in all joints, no evidence of swelling, pain   EXTREMITIES: no cyanosis, or edema  NEURO: Oriented x3  PSYCHIATRIC: appropriate affect    ASSESSMENT/ PLAN:   1. Acute appendicitis, unspecified acute appendicitis type/s/p laparoscopic appendectomy  CT AP showed mildly enlarged appendix measuring up to 1 cm, mild infiltration of fat around the appendix-findings are consistent with early appendicitis-no evidence of perforation or abscess  Seen by general surgery and underwent  laparoscopic appendectomy  Denies any abdominal pain  Reports incisional/muscular pain to the abdomen  Reports pain with movement is 5/10 and at rest is 2/10  Continue:  Norco 5/325 mg 1-2 tabs every 4-6 hours as needed for pain-currently not taking  Recommended:  Tylenol 500 mg 1-2 tabs every 6-8 hours as needed-alternate with  Rx sent for:  ibuprofen 600 MG Oral Tab; Take 1 tablet (600 mg total) by mouth every 6 (six) hours as needed for Pain.  Dispense: 30 tablet; Refill: 0  Denies N/V  Denies F/C  Lap sites x 3 to abdomen with glue closure that are D/I and CYNTHIA  Tolerating an oral diet  Appetite is good/drinking well  Moving bowels-had constipation/passing gas  Recommended:  Incentive spirometer 5-10 times per hour WA to prevent postop complications  Up ambulating 5-10 minutes every 30-60 minutes to prevent postop complications  Placed abdominal binder on with good relief  Postop restrictions discussed:  No heavy lifting greater than 10 pounds x 6 weeks  Okay to shower  No submerging, tub baths, swimming pools, hot tubs  Follow-up with general surgery PA on 3/12 at 2:45 PM  Establish with PCP Dr. Reyes on 4/1 at 4:10 PM  All hospital records, labs, radiology reviewed    2. Drug-induced constipation  Developed constipation postoperatively  Patient reports using MiraLAX with good results  Last BM was on 3/4  Continue:  MiraLAX 17 g in 8 ounces of fluid daily as needed for constipation  Goal is to have soft formed stool daily per patient's normal regimen  Notify TCC/PCP if no improvement in symptoms      Orders Placed This Encounter    acetaminophen 500 MG Oral Tab     Sig: Take 2 tablets (1,000 mg total) by mouth every 6 (six) hours as needed for Pain.    ibuprofen 600 MG Oral Tab     Sig: Take 1 tablet (600 mg total) by mouth every 6 (six) hours as needed for Pain.     Dispense:  30 tablet     Refill:  0           Medications & Refills for this Visit:   acetaminophen 500 MG Oral Tab Take 2 tablets (1,000 mg total)  by mouth every 6 (six) hours as needed for Pain.      ibuprofen 600 MG Oral Tab Take 1 tablet (600 mg total) by mouth every 6 (six) hours as needed for Pain. 30 tablet 0    HYDROcodone-acetaminophen (NORCO) 5-325 MG Oral Tab Take 1 tablet by mouth every 6 (six) hours as needed for Pain. 15 tablet 0     Requested Prescriptions     Signed Prescriptions Disp Refills    ibuprofen 600 MG Oral Tab 30 tablet 0     Sig: Take 1 tablet (600 mg total) by mouth every 6 (six) hours as needed for Pain.         Health Maintenance:  Health Maintenance   Topic Date Due    Annual Physical  Never done    HPV Vaccines (1 - 3-dose series) Never done    DTaP,Tdap,and Td Vaccines (1 - Tdap) Never done    Pap Smear  Never done    COVID-19 Vaccine (3 - 2023-24 season) 09/01/2023    Influenza Vaccine (1) 10/01/2023    Annual Depression Screening  Completed    Pneumococcal Vaccine: Birth to 64yrs  Completed       Chronic Care Management Referral: N/A      Transitional Care Management Certification:  During the visit, the following was completed:  Obtained and reviewed discharge summary, continuity of care documents, Surgery Notes, and discharge information   Reviewed Labs (CBC, CMP), Labs (Pathology), Labs (Microbiology), CT radiology results, Operative reports: Surgery, lipase, hCG, pregnancy test, lactic acid, need for follow-up on pending tests, need for follow-up on diagnostic tests, and need for follow-up on treatments    Medication Reconciliation:  I am aware of an inpatient discharge within the last 30 days.  The discharge medication list has been reconciled with the patient's current medication list and reviewed by me.  See medication list for additions of new medication, and changes to current doses of medications and discontinued medications.        Discharge Disposition/Plan:     Future Appointments   Date Time Provider Department Center   3/12/2024  2:45 PM [id number]   4/1/2024  4:10 PM Rosana Reyes MD  [facility] [address] [facility] [address]      1.  Transitional Care Clinic Visit: Next visit Discharged  2.  PCP Visit: 4/1/2024  3.  Chronic Care Management: N/A     ANDREW Salas, 3/5/2024  [facility]  [phone]

## 2024-03-07 NOTE — PROGRESS NOTES
TRANSITIONAL CARE CLINIC PHARMACIST MEDICATION RECONCILIATION        Nida Mulligan MRN [id number]    10/12/2000 PCP None Pcp       Comments: Medication history completed by the Transitional Care Clinic Pharmacist with the patient and sister in the office.     The following medication changes occurred while patient was hospitalized:  Medications Started:  Hydrocodone/APAP 5-325mg tabs - 1 tablet every 6 hours as needed    Outpatient Encounter Medications as of 3/5/2024   Medication Sig    acetaminophen 500 MG Oral Tab Take 2 tablets (1,000 mg total) by mouth every 6 (six) hours as needed for Pain.    ibuprofen 600 MG Oral Tab Take 1 tablet (600 mg total) by mouth every 6 (six) hours as needed for Pain.    HYDROcodone-acetaminophen (NORCO) 5-325 MG Oral Tab Take 1 tablet by mouth every 6 (six) hours as needed for Pain.     Medication Adherence Assessment:   Patient reports only taking the Hydrocodone/APAP four times since discharge.  States she has some soreness, but not a lot of pain.  Reports taking Acetaminophen and Ibuprofen as needed for the pain.  Patient has been taking 2-3 Ibuprofen tablets (600mg) with each dose.  Pended order for Ibuprofen 600mg tablets.  Discussed with the patient alternating the Acetaminophen and Ibuprofen if she feels she needs a little more pain relief, or can just use one or the other.  Patient understands.  Does not take any other medications.      Reviewed all medications in detail with patient including dose, indication, timing of administration, monitoring parameters, and potential side effects of medications.   Patient confirmed understanding.     Thank you,    Peace Doran, PharmD, 3/7/2024, 4:37 PM  Transitional Care Clinic

## 2024-03-12 ENCOUNTER — OFFICE VISIT (OUTPATIENT)
Facility: LOCATION | Age: 24
End: 2024-03-12
Payer: COMMERCIAL

## 2024-03-12 VITALS — HEART RATE: 80 BPM | TEMPERATURE: 98 F

## 2024-03-12 DIAGNOSIS — Z90.49 S/P LAPAROSCOPIC APPENDECTOMY: Primary | ICD-10-CM

## 2024-03-12 PROBLEM — K35.80 ACUTE APPENDICITIS, UNSPECIFIED ACUTE APPENDICITIS TYPE: Status: RESOLVED | Noted: 2024-02-27 | Resolved: 2024-03-12

## 2024-03-12 PROCEDURE — 99024 POSTOP FOLLOW-UP VISIT: CPT

## 2024-03-12 NOTE — PROGRESS NOTES
Post Operative Visit Note       Active Problems  1. S/P laparoscopic appendectomy         Chief Complaint   Chief Complaint   Patient presents with    Post-Op     PO - 2/27 w/ [facility] - LAPAROSCOPIC APPENDECTOMY, Pt. States soreness/pain Pt. Denies n/v/d Pt. Denies chills/fever Pt. Denies redness.           History of Present Illness   23 year old female POD# 14 from laparoscopic appendectomy presents for postop follow up.    The patient states she is doing well overall. She continues to experience occasional discomfort but states her symptoms are relieved with ibuprofen as needed. She also reports constipation. She is taking Miralax as needed. The patient is otherwise doing well. She denies fever or chills. She has no concern for infection at her incisions. She is tolerating oral intake.    The patient would like a return to work note.       Allergies  Nida has No Known Allergies.    Past Medical / Surgical / Social / Family History    The past medical and past surgical history have been reviewed by me today.     History reviewed. No pertinent past medical history.  Past Surgical History:   Procedure Laterality Date    LAPAROSCOPIC APPENDECTOMY  02/27/2024       The family history and social history have been reviewed by me today.    History reviewed. No pertinent family history.  Social History     Socioeconomic History    Marital status: Single   Tobacco Use    Smoking status: Never     Passive exposure: Never    Smokeless tobacco: Never   Vaping Use    Vaping Use: Never used   Substance and Sexual Activity    Alcohol use: Not Currently    Drug use: Not Currently        Current Outpatient Medications:     acetaminophen 500 MG Oral Tab, Take 2 tablets (1,000 mg total) by mouth every 6 (six) hours as needed for Pain., Disp: , Rfl:     ibuprofen 600 MG Oral Tab, Take 1 tablet (600 mg total) by mouth every 6 (six) hours as needed for Pain., Disp: 30 tablet, Rfl: 0    HYDROcodone-acetaminophen (NORCO) 5-325 MG Oral  Tab, Take 1 tablet by mouth every 6 (six) hours as needed for Pain., Disp: 15 tablet, Rfl: 0      Review of Systems  A 10 point Review of Systems has been completed by me today and is negative except as above in the HPI.    Physical Findings   Pulse 80   Temp 97.8 °F (36.6 °C) (Temporal)   LMP 02/11/2024 (Approximate)   Gen/psych: alert and oriented, cooperative, no apparent distress  Cardiovascular: regular rate  Respiratory: respirations unlabored, no wheeze  Abdominal: soft, non-tender, non-distended, no guarding/rebound  Incisions: clean, dry, and intact with skin glue. No bleeding or surrounding erythema         Assessment/Plan  1. S/P laparoscopic appendectomy        23 year old female POD# 14 from laparoscopic appendectomy    Overall the patient is doing well today.     Continue p.o. Tylenol or Motrin for pain as needed    The patient may apply soap and water directly to the incisions.     The skin glue will fall off on their own. Once they begin peeling off you can remove them from the incisions    Do not lift anything greater than 15 pounds until 6 weeks after surgery.    The patient may return to work today with lifting restrictions as stated above.    Pathology discussed with the patient.     The patient is doing well and does not require further follow up appointments. If there are any questions or concerns, please call the office or make an appointment as needed.    The patient understands and agrees to the current plan. All questions and concerns were addressed during today's encounter.           No orders of the defined types were placed in this encounter.       Imaging & Referrals   None    Follow Up  Return if symptoms worsen or fail to improve.    Shaila Mann PA-C  [facility] [place] [facility]  3/12/2024  2:58 PM

## 2024-03-17 PROBLEM — K59.03 DRUG-INDUCED CONSTIPATION: Status: ACTIVE | Noted: 2024-03-17

## (undated) DEVICE — COVER LT HNDL RIG FOR SUR CAM DISP

## (undated) DEVICE — GRASPER LAP L35MM DIA5MM EPIX

## (undated) DEVICE — GLOVE SUR 7 SENSICARE PI PIP CRM PWD F

## (undated) DEVICE — ENSEAL SEALER TISS L37 CM CURV

## (undated) DEVICE — SLEEVE COMPR M KNEE LEN SGL USE KENDALL SCD

## (undated) DEVICE — Device

## (undated) DEVICE — TRAY CATH 16FR F INCL BARDX IC COMPLT CARE

## (undated) DEVICE — SLEEVE TRCR L100MM DIA5MM ABD Z THRD KII

## (undated) DEVICE — SUTURE VCRL SZ 0 L54IN ABSRB UD POLYGLACTIN

## (undated) DEVICE — SEALER LAP L37CM MARYLAND JAW OPN NANO COAT

## (undated) DEVICE — RELOAD STPLR 45X3.6MM 6 ROW BLU ENDOSCP LIN

## (undated) DEVICE — TIP GRSP DISP HNTR RENEW

## (undated) DEVICE — NEEDLE VERESS 120MM

## (undated) DEVICE — LAPAROVUE VISIBILITY SYS

## (undated) DEVICE — DISSECTOR MIRCO TIP MARYLAND

## (undated) DEVICE — BLADE SURG NO11 SS POLYMER COAT STR MICROCOAT

## (undated) DEVICE — STAPLER MED SHFT L340MM JAW L45MM STD ECHELON

## (undated) DEVICE — POUCH SPECIMEN WIRE 6X3 250ML

## (undated) DEVICE — ADHESIVE SKIN TOP FOR WND CLSR DERMBND ADV

## (undated) DEVICE — APPLICATOR SKIN PREP 26ML HI LT ORNG 2% CHG

## (undated) DEVICE — SUTURE MCRYL 4-0 18IN ABSRB UD 19MM PS-2 3/8

## (undated) DEVICE — GLOVE SURGICAL 7.5 SENSICARE P

## (undated) DEVICE — PENCIL TELESCOPE MEGADYNE SE

## (undated) DEVICE — PACK PBDS LAP CHOLE MODULE

## (undated) DEVICE — SYSTEM POS W20XH1XL29IN PT PIGAZZI

## (undated) DEVICE — SYSTEM ACCS L100MM DIA5MM ABD 1ST ENTRY Z

## (undated) DEVICE — SOLUTION IRRIG 1000ML 0.9% NACL USP BTL

## (undated) DEVICE — SYSTEM ABD ACCS 12X100MM 1ST ENTRY Z

## (undated) NOTE — LETTER
[address]  [address]  Authorization for Surgical Operation and Procedure     Date:__2/27/24___                                                                                                         Time:__1030___  I hereby authorize Surgeon(s):  Grant Bragg MD, my physician and his/her assistants (if applicable), which may include medical students, residents, and/or fellows, to perform the following surgical operation/ procedure and administer such anesthesia as may be determined necessary by my physician:  Operation/Procedure name (s) Procedure(s):  LAPAROSCOPIC APPENDECTOMY on Nida Mulligan   2.   I recognize that during the surgical operation/procedure, unforeseen conditions may necessitate additional or different procedures than those listed above.  I, therefore, further authorize and request that the above-named surgeon, assistants, or designees perform such procedures as are, in their judgment, necessary and desirable.    3.   My surgeon/physician has discussed prior to my surgery the potential benefits, risks and side effects of this procedure; the likelihood of achieving goals; and potential problems that might occur during recuperation.  They also discussed reasonable alternatives to the procedure, including risks, benefits, and side effects related to the alternatives and risks related to not receiving this procedure.  I have had all my questions answered and I acknowledge that no guarantee has been made as to the result that may be obtained.    4.   Should the need arise during my operation/procedure, which includes change of level of care prior to discharge, I also consent to the administration of blood and/or blood products.  Further, I understand that despite careful testing and screening of blood or blood products by collecting agencies, I may still be subject to ill effects as a result of receiving a blood transfusion and/or blood products.  The  following are some, but not all, of the potential risks that can occur: fever and allergic reactions, hemolytic reactions, transmission of diseases such as Hepatitis, AIDS and Cytomegalovirus (CMV) and fluid overload.  In the event that I wish to have an autologous transfusion of my own blood, or a directed donor transfusion, I will discuss this with my physician.  Check only if Refusing Blood or Blood Products  I understand refusal of blood or blood products as deemed necessary by my physician may have serious consequences to my condition to include possible death. I hereby assume responsibility for my refusal and release the hospital, its personnel, and my physicians from any responsibility for the consequences of my refusal.          o  Refuse      5.   I authorize the use of any specimen, organs, tissues, body parts or foreign objects that may be removed from my body during the operation/procedure for diagnosis, research or teaching purposes and their subsequent disposal by hospital authorities.  I also authorize the release of specimen test results and/or written reports to my treating physician on the hospital medical staff or other referring or consulting physicians involved in my care, at the discretion of the Pathologist or my treating physician.    6.   I consent to the photographing or videotaping of the operations or procedures to be performed, including appropriate portions of my body for medical, scientific, or educational purposes, provided my identity is not revealed by the pictures or by descriptive texts accompanying them.  If the procedure has been photographed/videotaped, the surgeon will obtain the original picture, image, videotape or CD.  The hospital will not be responsible for storage, release or maintenance of the picture, image, tape or CD.    7.   I consent to the presence of a  or observers in the operating room as deemed necessary by my physician or their designees.     8.   I recognize that in the event my procedure results in extended X-Ray/fluoroscopy time, I may develop a skin reaction.    9. If I have a Do Not Attempt Resuscitation (DNAR) order in place, that status will be suspended while in the operating room, procedural suite, and during the recovery period unless otherwise explicitly stated by me (or a person authorized to consent on my behalf). The surgeon or my attending physician will determine when the applicable recovery period ends for purposes of reinstating the DNAR order.  10. Patients having a sterilization procedure: I understand that if the procedure is successful the results will be permanent and it will therefore be impossible for me to inseminate, conceive, or bear children.  I also understand that the procedure is intended to result in sterility, although the result has not been guaranteed.   11. I acknowledge that my physician has explained sedation/analgesia administration to me including the risk and benefits I consent to the administration of sedation/analgesia as may be necessary or desirable in the judgment of my physician.    I CERTIFY THAT I HAVE READ AND FULLY UNDERSTAND THE ABOVE CONSENT TO OPERATION and/or OTHER PROCEDURE.    _________________________________________  __________________________________  Signature of Patient     Signature of Responsible Person         ___________________________________         Printed Name of Responsible Person           _________________________________                 Relationship to Patient  _________________________________________  ______________________________  Signature of Witness          Date  Time      Patient Name: Nida Mulligan     : 10/12/2000                 Printed: 2024     Medical Record #: [id number]                     Page 1 of [address] [place] [address] [place]  [address]    Consent for Anesthesia    I, Nida RILEY  Ese agree to be cared for by an anesthesiologist, who is specially trained to monitor me and give me medicine to put me to sleep or keep me comfortable during my procedure    I understand that my anesthesiologist is not an employee or agent of [facility]. He or she works for [employer].    As the patient asking for anesthesia services, I agree to:  Allow the anesthesiologist (anesthesia doctor) to give me medicine and do additional procedures as necessary. Some examples are: Starting or using an “IV” to give me medicine, fluids or blood during my procedure, and having a breathing tube placed to help me breathe when I’m asleep (intubation). In the event that my heart stops working properly, I understand that my anesthesiologist will make every effort to sustain my life, unless otherwise directed by [facility] Do Not Resuscitate documents.  Tell my anesthesia doctor before my procedure:  If I am pregnant.  The last time that I ate or drank.  All of the medicines I take (including prescriptions, herbal supplements, and pills I can buy without a prescription (including street drugs/illegal medications). Failure to inform my anesthesiologist about these medicines may increase my risk of anesthetic complications.  If I am allergic to anything or have had a reaction to anesthesia before.  I understand how the anesthesia medicine will help me (benefits).  I understand that with any type of anesthesia medicine there are risks:  The most common risks are: nausea, vomiting, sore throat, muscle soreness, damage to my eyes, mouth, or teeth (from breathing tube placement).  Rare risks include: remembering what happened during my procedure, allergic reactions to medications, injury to my airway, heart, lungs, vision, nerves, or muscles and in extremely rare instances death.  My doctor has explained to me other choices available to me for my care (alternatives).  Pregnant  Patients (“epidural”):  I understand that the risks of having an epidural (medicine given into my back to help control pain during labor), include itching, low blood pressure, difficulty urinating, headache or slowing of the baby’s heart. Very rare risks include infection, bleeding, seizure, irregular heart rhythms and nerve injury.  Regional Anesthesia (“spinal”, “epidural”, & “nerve blocks”):  I understand that rare but potential complications include headache, bleeding, infection, seizure, irregular heart rhythms, and nerve injury.    I can change my mind about having anesthesia services at any time before I get the medicine.    _____________________________________________________________________________  Patient (or Representative) Signature/Relationship to Patient  Date   Time    _____________________________________________________________________________   Name (if used)    Language/Organization   Time    _____________________________________________________________________________  Anesthesiologist Signature     Date   Time  I have discussed the procedure and information above with the patient (or patient’s representative) and answered their questions. The patient or their representative has agreed to have anesthesia services.    _____________________________________________________________________________  Witness        Date   Time  I have verified that the signature is that of the patient or patient’s representative, and that it was signed before the procedure  Patient Name: Nida Mulligan     : 10/12/2000                 Printed: 2024     Medical Record #: [id number]                     Page 2 of 2

## (undated) NOTE — LETTER
3/12/2024    Return to Work    Name: Nida Mulligan        : 10/12/2000    To Whom It May Concern,    Nida Mulligan had surgery on 2024 and is:    Able to return to school / work with restrictions:  No lifting over: 15 lbs until 2024.    The patient may return to work on 3/12/2024.    If there are any further questions, regarding this patient's care, please contact the patient directly.    Sincerely,    Shaila Mann PA-C